# Patient Record
Sex: MALE | Race: OTHER | HISPANIC OR LATINO | URBAN - METROPOLITAN AREA
[De-identification: names, ages, dates, MRNs, and addresses within clinical notes are randomized per-mention and may not be internally consistent; named-entity substitution may affect disease eponyms.]

---

## 2022-07-22 ENCOUNTER — INPATIENT (INPATIENT)
Facility: HOSPITAL | Age: 27
LOS: 2 days | Discharge: HOME | DRG: 519 | End: 2022-07-25
Attending: STUDENT IN AN ORGANIZED HEALTH CARE EDUCATION/TRAINING PROGRAM | Admitting: STUDENT IN AN ORGANIZED HEALTH CARE EDUCATION/TRAINING PROGRAM
Payer: SELF-PAY

## 2022-07-22 VITALS
HEART RATE: 96 BPM | HEIGHT: 64 IN | DIASTOLIC BLOOD PRESSURE: 75 MMHG | TEMPERATURE: 99 F | RESPIRATION RATE: 18 BRPM | WEIGHT: 186.95 LBS | SYSTOLIC BLOOD PRESSURE: 125 MMHG | OXYGEN SATURATION: 97 %

## 2022-07-22 PROCEDURE — 99284 EMERGENCY DEPT VISIT MOD MDM: CPT

## 2022-07-22 RX ORDER — DIAZEPAM 5 MG
10 TABLET ORAL ONCE
Refills: 0 | Status: DISCONTINUED | OUTPATIENT
Start: 2022-07-22 | End: 2022-07-22

## 2022-07-22 RX ORDER — KETOROLAC TROMETHAMINE 30 MG/ML
30 SYRINGE (ML) INJECTION ONCE
Refills: 0 | Status: DISCONTINUED | OUTPATIENT
Start: 2022-07-22 | End: 2022-07-22

## 2022-07-22 RX ADMIN — Medication 10 MILLIGRAM(S): at 20:13

## 2022-07-22 RX ADMIN — Medication 60 MILLIGRAM(S): at 20:13

## 2022-07-22 RX ADMIN — Medication 30 MILLIGRAM(S): at 20:13

## 2022-07-22 RX ADMIN — Medication 30 MILLIGRAM(S): at 20:43

## 2022-07-22 NOTE — ED PROVIDER NOTE - NSFOLLOWUPINSTRUCTIONS_ED_ALL_ED_FT
motrin 600mg by mouth every 6 hours  robaxin 750mg- 1500mg by mouth 3 times a day for 5 days  prednisone 20mg by mouth 2 times a day for 5 days  follow up with doctor in 3 - 5 days  no heavy lifting

## 2022-07-22 NOTE — ED PROVIDER NOTE - PHYSICAL EXAMINATION
Alert, lucid, and in no apparent distress. Pt is normocephalic, atraumatic.  Pupils are equal, round,  lips pink, moist mucous membranes, tongue midline. Neck supple.   Lungs clear to ausultation. Heart regular rate and rhythm, normal S1, S2,   Abdomen is soft, nontender, no pulsatile mass, no masses, no distension  to palp over left buttock  4/5 motor foot with plantar flexion;  sensation intact.   Non-focal sensory, 5 out of 5 motor strength, no dysmetria, fluent, goal directed speech. CN2 to 12 intact. Skin without rash, purpura, eccyhmosis  No submandibular adenopathy. Normal mentation, does not appear agitated

## 2022-07-22 NOTE — ED PROVIDER NOTE - OBJECTIVE STATEMENT
25 yo male no pmh comes to ed with 6 days of pain and numbness in left leg after lifting 300pounds onto thighs while exercising; pt noted losing control of weight; pt noted symptoms have persisted;  denies urinary or fecal incontinence;

## 2022-07-22 NOTE — ED ADULT NURSE NOTE - PAIN RATING/NUMBER SCALE (0-10): ACTIVITY
Right LE Active ROM was WFL (within functional limits)/deficits as listed below/Left UE Active ROM was WFL (within functional limits)/Right UE Active ROM was WFL (within functional limits)/Left LE Active ROM was WFL (within functional limits)/Right UE shoulder flex ~ 90 degrees. Right hip flex ~ 90 degrees and Right DF ~ -15 degrees. Left LE hip flex ~ 90 degrees, knee flex/ext ~0-~70 degrees and DF neutral. 6

## 2022-07-22 NOTE — ED PROVIDER NOTE - CLINICAL SUMMARY MEDICAL DECISION MAKING FREE TEXT BOX
back pain with radiation to left leg;   eval fraction ; ct lumbosacral  prednisone , valium and toradol back pain with radiation to left leg;   eval fracture ; ct lumbosacral  prednisone , valium and toradol

## 2022-07-22 NOTE — ED PROVIDER NOTE - PATIENT PORTAL LINK FT
You can access the FollowMyHealth Patient Portal offered by [facility] by registering at the following website: [email or web address]. By joining [facility]’s FollowMyHealth portal, you will also be able to view your health information using other applications (apps) compatible with our system.

## 2022-07-22 NOTE — ED ADULT TRIAGE NOTE - CHIEF COMPLAINT QUOTE
pt c/o pain to left foot with numbness was exercising & foot is numb and having back pain as well since saturday   A&Ox3, resp wnl, states numbness to great toe, unable to flex foot

## 2022-07-22 NOTE — ED PROVIDER NOTE - CARE PROVIDER_API CALL
Chrissie Rodriguez)  Neurosurgery  [address] [place] [address]  Phone: [phone]  Fax: [phone]  Follow Up Time:

## 2022-07-22 NOTE — ED ADULT NURSE NOTE - OBJECTIVE STATEMENT
PT A&Ox4 states he was working out and with a weight on his abdomen which require him to lift with his hips pt states he went to fast and weight came down hard on hip and he felt the pain in his back. pt states the pain starts in lower back and radiates down left leg to the foot which he states he currently has numbness to his toe. pt updated on plan of care awaiting ct scan.

## 2022-07-23 ENCOUNTER — TRANSCRIPTION ENCOUNTER (OUTPATIENT)
Age: 27
End: 2022-07-23

## 2022-07-23 DIAGNOSIS — M48.061 SPINAL STENOSIS, LUMBAR REGION WITHOUT NEUROGENIC CLAUDICATION: ICD-10-CM

## 2022-07-23 DIAGNOSIS — M54.9 DORSALGIA, UNSPECIFIED: ICD-10-CM

## 2022-07-23 LAB
ABO RH CONFIRMATION: SIGNIFICANT CHANGE UP
ALBUMIN SERPL ELPH-MCNC: 5 G/DL — SIGNIFICANT CHANGE UP (ref 3.3–5.2)
ALP SERPL-CCNC: 82 U/L — SIGNIFICANT CHANGE UP (ref 40–120)
ALT FLD-CCNC: 111 U/L — HIGH
ANION GAP SERPL CALC-SCNC: 12 MMOL/L — SIGNIFICANT CHANGE UP (ref 5–17)
APTT BLD: 35.9 SEC — HIGH (ref 27.5–35.5)
AST SERPL-CCNC: 63 U/L — HIGH
BASOPHILS # BLD AUTO: 0.04 K/UL — SIGNIFICANT CHANGE UP (ref 0–0.2)
BASOPHILS NFR BLD AUTO: 0.4 % — SIGNIFICANT CHANGE UP (ref 0–2)
BILIRUB SERPL-MCNC: 0.8 MG/DL — SIGNIFICANT CHANGE UP (ref 0.4–2)
BLD GP AB SCN SERPL QL: SIGNIFICANT CHANGE UP
BUN SERPL-MCNC: 14.3 MG/DL — SIGNIFICANT CHANGE UP (ref 8–20)
CALCIUM SERPL-MCNC: 9.5 MG/DL — SIGNIFICANT CHANGE UP (ref 8.6–10.2)
CHLORIDE SERPL-SCNC: 101 MMOL/L — SIGNIFICANT CHANGE UP (ref 98–107)
CO2 SERPL-SCNC: 26 MMOL/L — SIGNIFICANT CHANGE UP (ref 22–29)
CREAT SERPL-MCNC: 0.99 MG/DL — SIGNIFICANT CHANGE UP (ref 0.5–1.3)
EGFR: 106 ML/MIN/1.73M2 — SIGNIFICANT CHANGE UP
EOSINOPHIL # BLD AUTO: 0.01 K/UL — SIGNIFICANT CHANGE UP (ref 0–0.5)
EOSINOPHIL NFR BLD AUTO: 0.1 % — SIGNIFICANT CHANGE UP (ref 0–6)
GLUCOSE SERPL-MCNC: 150 MG/DL — HIGH (ref 70–99)
HCT VFR BLD CALC: 49.5 % — SIGNIFICANT CHANGE UP (ref 39–50)
HGB BLD-MCNC: 17.4 G/DL — HIGH (ref 13–17)
IMM GRANULOCYTES NFR BLD AUTO: 1.2 % — SIGNIFICANT CHANGE UP (ref 0–1.5)
INR BLD: 1.02 RATIO — SIGNIFICANT CHANGE UP (ref 0.88–1.16)
LYMPHOCYTES # BLD AUTO: 1.49 K/UL — SIGNIFICANT CHANGE UP (ref 1–3.3)
LYMPHOCYTES # BLD AUTO: 15.2 % — SIGNIFICANT CHANGE UP (ref 13–44)
MCHC RBC-ENTMCNC: 30.4 PG — SIGNIFICANT CHANGE UP (ref 27–34)
MCHC RBC-ENTMCNC: 35.2 GM/DL — SIGNIFICANT CHANGE UP (ref 32–36)
MCV RBC AUTO: 86.4 FL — SIGNIFICANT CHANGE UP (ref 80–100)
MONOCYTES # BLD AUTO: 0.16 K/UL — SIGNIFICANT CHANGE UP (ref 0–0.9)
MONOCYTES NFR BLD AUTO: 1.6 % — LOW (ref 2–14)
NEUTROPHILS # BLD AUTO: 8.01 K/UL — HIGH (ref 1.8–7.4)
NEUTROPHILS NFR BLD AUTO: 81.5 % — HIGH (ref 43–77)
PLATELET # BLD AUTO: 252 K/UL — SIGNIFICANT CHANGE UP (ref 150–400)
POTASSIUM SERPL-MCNC: 4.3 MMOL/L — SIGNIFICANT CHANGE UP (ref 3.5–5.3)
POTASSIUM SERPL-SCNC: 4.3 MMOL/L — SIGNIFICANT CHANGE UP (ref 3.5–5.3)
PROT SERPL-MCNC: 7.7 G/DL — SIGNIFICANT CHANGE UP (ref 6.6–8.7)
PROTHROM AB SERPL-ACNC: 11.8 SEC — SIGNIFICANT CHANGE UP (ref 10.5–13.4)
RBC # BLD: 5.73 M/UL — SIGNIFICANT CHANGE UP (ref 4.2–5.8)
RBC # FLD: 11.9 % — SIGNIFICANT CHANGE UP (ref 10.3–14.5)
SARS-COV-2 RNA SPEC QL NAA+PROBE: SIGNIFICANT CHANGE UP
SODIUM SERPL-SCNC: 139 MMOL/L — SIGNIFICANT CHANGE UP (ref 135–145)
WBC # BLD: 9.83 K/UL — SIGNIFICANT CHANGE UP (ref 3.8–10.5)
WBC # FLD AUTO: 9.83 K/UL — SIGNIFICANT CHANGE UP (ref 3.8–10.5)

## 2022-07-23 PROCEDURE — 72148 MRI LUMBAR SPINE W/O DYE: CPT | Mod: 26,ME

## 2022-07-23 PROCEDURE — 99252 IP/OBS CONSLTJ NEW/EST SF 35: CPT

## 2022-07-23 PROCEDURE — G1004: CPT

## 2022-07-23 PROCEDURE — 72131 CT LUMBAR SPINE W/O DYE: CPT | Mod: 26,MA

## 2022-07-23 RX ORDER — ACETAMINOPHEN 500 MG
975 TABLET ORAL ONCE
Refills: 0 | Status: COMPLETED | OUTPATIENT
Start: 2022-07-23 | End: 2022-07-23

## 2022-07-23 RX ORDER — METHOCARBAMOL 500 MG/1
1500 TABLET, FILM COATED ORAL ONCE
Refills: 0 | Status: COMPLETED | OUTPATIENT
Start: 2022-07-23 | End: 2022-07-23

## 2022-07-23 RX ORDER — CEFAZOLIN SODIUM 1 G
2000 VIAL (EA) INJECTION ONCE
Refills: 0 | Status: COMPLETED | OUTPATIENT
Start: 2022-07-24 | End: 2022-07-24

## 2022-07-23 RX ORDER — GABAPENTIN 400 MG/1
300 CAPSULE ORAL ONCE
Refills: 0 | Status: COMPLETED | OUTPATIENT
Start: 2022-07-23 | End: 2022-07-23

## 2022-07-23 RX ORDER — METHOCARBAMOL 500 MG/1
2 TABLET, FILM COATED ORAL
Qty: 30 | Refills: 0
Start: 2022-07-23 | End: 2022-07-27

## 2022-07-23 RX ORDER — IBUPROFEN 200 MG
1 TABLET ORAL
Qty: 28 | Refills: 0
Start: 2022-07-23 | End: 2022-07-29

## 2022-07-23 RX ORDER — SODIUM CHLORIDE 9 MG/ML
1000 INJECTION INTRAMUSCULAR; INTRAVENOUS; SUBCUTANEOUS
Refills: 0 | Status: DISCONTINUED | OUTPATIENT
Start: 2022-07-23 | End: 2022-07-24

## 2022-07-23 RX ORDER — ACETAMINOPHEN 500 MG
650 TABLET ORAL EVERY 6 HOURS
Refills: 0 | Status: DISCONTINUED | OUTPATIENT
Start: 2022-07-23 | End: 2022-07-25

## 2022-07-23 RX ORDER — KETOROLAC TROMETHAMINE 30 MG/ML
15 SYRINGE (ML) INJECTION ONCE
Refills: 0 | Status: DISCONTINUED | OUTPATIENT
Start: 2022-07-23 | End: 2022-07-23

## 2022-07-23 RX ADMIN — SODIUM CHLORIDE 50 MILLILITER(S): 9 INJECTION INTRAMUSCULAR; INTRAVENOUS; SUBCUTANEOUS at 17:56

## 2022-07-23 RX ADMIN — METHOCARBAMOL 1500 MILLIGRAM(S): 500 TABLET, FILM COATED ORAL at 09:34

## 2022-07-23 RX ADMIN — GABAPENTIN 300 MILLIGRAM(S): 400 CAPSULE ORAL at 09:34

## 2022-07-23 RX ADMIN — Medication 975 MILLIGRAM(S): at 09:34

## 2022-07-23 RX ADMIN — Medication 15 MILLIGRAM(S): at 10:04

## 2022-07-23 RX ADMIN — Medication 15 MILLIGRAM(S): at 09:34

## 2022-07-23 RX ADMIN — Medication 975 MILLIGRAM(S): at 10:04

## 2022-07-23 NOTE — H&P ADULT - NSHPREVIEWOFSYSTEMS_GEN_ALL_CORE
REVIEW OF SYSTEMS  Negative except as noted in HPI  CONSTITUTIONAL: No fever, weight loss, or fatigue  EYES: No eye pain, visual disturbances, or discharge  ENMT:  No difficulty hearing, tinnitus, vertigo; No sinus or throat pain  NECK: No pain or stiffness  BREASTS: No pain, masses, or nipple discharge  RESPIRATORY: No cough, wheezing, chills or hemoptysis; No shortness of breath  CARDIOVASCULAR: No chest pain, palpitations, dizziness, or leg swelling  GASTROINTESTINAL: No abdominal or epigastric pain. No nausea, vomiting, or hematemesis; No diarrhea or constipation. No melena or hematochezia., denies bowel or bladder incontinence  GENITOURINARY: No dysuria, frequency, hematuria, or incontinence  NEUROLOGICAL: No headaches, memory loss, or tremors, (+) decreased sensation on the left lower extremity, left foot weakness  SKIN: No itching, burning, rashes, or lesions   LYMPH NODES: No enlarged glands  ENDOCRINE: No heat or cold intolerance; No hair loss  MUSCULOSKELETAL: No joint pain or swelling; No muscle, back, or extremity pain  PSYCHIATRIC: No depression, anxiety, mood swings, or difficulty sleeping  HEME/LYMPH: No easy bruising, or bleeding gums  ALLERGY AND IMMUNOLOGIC: No hives or eczema

## 2022-07-23 NOTE — H&P ADULT - HISTORY OF PRESENT ILLNESS
Patient is a 26y old  Male who presents with a chief complaint of Left lower extremity numbness, pain and foot drop    HPI:  27 yo male with no significant pmhx presents with father at bedside who reports that last week Friday while doing gluteal thrust the weights fell on him and afterwards he had buttock pain. He states that on Saturday he started to have pain down the back of his leg and into his foot. He states that on Monday he noticed that he was having weakness in his left foot and was unable to move the foot. He denies any bowel or bladder incontinence , weakness, numbness or tingling in his right leg or bilateral upper extremities.       PAST MEDICAL & SURGICAL HISTORY:    FAMILY HISTORY:          Allergies    No Known Allergies    Intolerances      HOME MEDICATIONS:  Home Medications:      MEDICATIONS:  Antibiotics:    Neuro:    Anticoagulation:    OTHER:    IVF:      Vital Signs Last 24 Hrs  T(C): 37.2 (22 Jul 2022 18:12), Max: 37.2 (22 Jul 2022 18:12)  T(F): 99 (22 Jul 2022 18:12), Max: 99 (22 Jul 2022 18:12)  HR: 96 (22 Jul 2022 18:12) (96 - 96)  BP: 125/75 (22 Jul 2022 18:12) (125/75 - 125/75)  BP(mean): --  RR: 18 (22 Jul 2022 18:12) (18 - 18)  SpO2: 97% (22 Jul 2022 18:12) (97% - 97%)    Parameters below as of 22 Jul 2022 18:12  Patient On (Oxygen Delivery Method): room air

## 2022-07-23 NOTE — CHART NOTE - NSCHARTNOTEFT_GEN_A_CORE
Neurosurgery     MRI L Spine completed which shows a large extruded disc fragment that migrated inferiorly on the left.    Plan is for the OR today, probably after 3.   NPO  Consent signed  T&S ordered  Covid done, results pending

## 2022-07-23 NOTE — H&P ADULT - NS ATTEND AMEND GEN_ALL_CORE FT
Neurosurgery attending attestation:    Patient seen and examined at bedside.  Agree with note and plan as stated above.    26-year-old gentleman with otherwise no significant past medical history presents with 5-day history of new onset left leg radiculopathy and foot drop.  He reports that he was in his usual state of health when he was lifting weights on Friday and was doing gluteal bridges when he lost his footing and slipped.  He reports initially he did not seem to have any significant symptoms but by Saturday he was started to experience shooting pain which travels from his buttocks down to his lateral thigh and anterolateral leg to the dorsal aspect of the left foot.  He reports that by Monday he started noticing having trouble lifting his left foot off the ground.  On exam he endorses a L5 distribution left leg radiculopathy as well as 0/5 DF/EHL in the left leg but otherwise full strength throughout.  MRI L-spine demonstrates disc bulges at L4-5 and L5-S1  with notable disc herniation and mobile fragment migration inferiorly on the left lateral recess at L4-5 causing severe stenosis on the left side.  I had a lengthy discussion with the patient at bedside and explained that his symptoms are likely secondary to nerve root compression from the acute disc herniation which likely occurred when he had his accident at the gym.   We discussed that given that he has a new foot drop and weakness he would benefit from surgical decompression through a microdiscectomy to relieve pressure on the affected nerves.  I discussed with him that the goal of surgery, especially now that it has been approximately 5 days since the injury is to prevent any further nerve damage.  We discussed that after this procedure  we would hopefully see improvement in his dorsiflexor function in his left foot given time.  We discussed the various risks and benefits of surgery.  The risks include but are not limited to bleeding, pain, infection, dural tear/CSF leak, damage to surrounding structures, motor/sensory deficits, paralysis, spinal stroke, death, and other risks of anesthesia which include blood clots.  The patient verbalized agreement and understanding of the risks and benefits of surgery and wishes to proceed.  No guarantees of success were stated or implied.   Plan for OR today for left-sided microdiscectomy Neurosurgery attending attestation:    Patient seen and examined at bedside.  Agree with note and plan as stated above.    26-year-old gentleman with otherwise no significant past medical history presents with 5-day history of new onset left leg radiculopathy and foot drop.  He reports that he was in his usual state of health when he was lifting weights on Friday and was doing gluteal bridges when he lost his footing and slipped.  He reports initially he did not seem to have any significant symptoms but by Saturday he was started to experience shooting pain which travels from his buttocks down to his lateral thigh and anterolateral leg to the dorsal aspect of the left foot.  He reports that by Monday he started noticing having trouble lifting his left foot off the ground.  On exam he endorses a L5 distribution left leg radiculopathy as well as 0/5 DF/EHL in the left leg but otherwise full strength throughout.  MRI L-spine demonstrates disc bulges at L4-5 and L5-S1  with notable disc herniation and mobile fragment migration inferiorly on the left lateral recess at L4-5 causing severe stenosis on the left side.  I had a lengthy discussion with the patient at bedside and explained that his symptoms are likely secondary to nerve root compression from the acute disc herniation which likely occurred when he had his accident at the gym.   We discussed that given that he has a new foot drop and weakness he would benefit from surgical decompression through a microdiscectomy to relieve pressure on the affected nerves.  I discussed with him that the goal of surgery, especially now that it has been approximately 5 days since the injury is to prevent any further nerve damage.  We discussed that after this procedure  we would hopefully see improvement in his dorsiflexor function in his left foot given time.  We discussed the various risks and benefits of surgery.  The risks include but are not limited to bleeding, pain, infection, dural tear/CSF leak, damage to surrounding structures, motor/sensory deficits, paralysis, spinal stroke, death, and other risks of anesthesia which include blood clots.  The patient verbalized agreement and understanding of the risks and benefits of surgery and wishes to proceed.  No guarantees of success were stated or implied.   Plan for OR today vs tomorrow morning for left-sided microdiscectomy

## 2022-07-23 NOTE — PATIENT PROFILE ADULT - FALL HARM RISK - UNIVERSAL INTERVENTIONS
Bed in lowest position, wheels locked, appropriate side rails in place/Call bell, personal items and telephone in reach/Instruct patient to call for assistance before getting out of bed or chair/Non-slip footwear when patient is out of bed/[place] to call system/Physically safe environment - no spills, clutter or unnecessary equipment/Purposeful Proactive Rounding/Room/bathroom lighting operational, light cord in reach

## 2022-07-23 NOTE — H&P ADULT - REASON FOR ADMISSION
Left lower back pain, radiating down the back of the leg with numbness and weakness in the left foot

## 2022-07-23 NOTE — H&P ADULT - NSHPPHYSICALEXAM_GEN_ALL_CORE
PHYSICAL EXAM:  GENERAL: NAD, well-groomed, well-developed  EYES: Conjunctiva and sclera clear; corneal reflex intact  ENMT: No tonsillar erythema, exudates, or enlargement; moist mucous membranes, good dentition, no lesions  NECK: Supple  RICHARD COMA SCORE: E- V- M- =15       E: 4= opens eyes spontaneously       V: 5= oriented       M: 6= follows commands   MENTAL STATUS: AAO x3; Awake, Opens eyes spontaneously, Appropriately conversant without aphasia; following simple commands  CRANIAL NERVES: PERRL. EOMI without nystagmus. Facial sensation intact V1-3 distribution b/l. Face symmetric w/ normal eye closure and smile, tongue midline. Hearing grossly intact. Speech clear. Head turning and shoulder shrug intact.   REFLEXES: PERRL. Negative Schneider's b/l. Negative clonus b/l  MOTOR: strength 5/5 b/l upper and lower extremities, except left foot dorsiflexion 0/5, Plantar flexion 3/5, EHL 2/5  Uppers     Delt (C5/6)     Bicep (C5/6)     Wrist Extend (C6)     Tricep (C7)     HG (C8/T1)  R                     5/5                 5/5                         5/5                           5/5                   5/5  L                      5/5                 5/5                         5/5                           5/5                   5/5  Lowers      HF(L1/L2)     KE (L3)     DF (L4)     EHL (L5)     PF (S1)      R                     5/5              5/5           5/5           5/5            5/5  L                     5/5               5/5          0/5            2/5            3/5  SENSATION:  (+) decreased sensation on the left leg lateral shin  CHEST/LUNG: Clear to auscultation bilaterally  HEART: +S1/+S2  ABDOMEN: Soft  EXTREMITIES: no clubbing, cyanosis, or edema  SKIN: Warm, dry; no rashes or lesions

## 2022-07-23 NOTE — H&P ADULT - ASSESSMENT
25 yo male with L4/5, L5/S1 stenosis and extruded disc material compressing the left L5 nerve root, with radicular pain and myelopathy

## 2022-07-23 NOTE — ED ADULT NURSE REASSESSMENT NOTE - NS ED NURSE REASSESS COMMENT FT1
Patient received sleeping in cart, awakes to verbal stimuli.  Patient awaiting MRI.  Patient has no labored breathing and is in no acute distress.

## 2022-07-23 NOTE — H&P ADULT - NSHPLABSRESULTS_GEN_ALL_CORE
LABS:      CULTURES:      RADIOLOGY & ADDITIONAL STUDIES:  < from: CT Lumbar Spine No Cont (07.23.22 @ 00:23) >    IMPRESSION:    Mild intervertebral disc space narrowing at L4-L5 and L5-S1 with   posterior disc/osteophyte complexes and mild facet and ligamentous   hypertrophy. Mild to moderate segmental canal stenosis at L4-L5 and mild   segmental canal stenosisat L5-S1. Abnormal density along the dorsal   aspect of the L4 vertebral body along the left side within the lateral   recess concerning for extruded disc material (series 8:28) which may be   compressing the L5 nerve root. MRI of the lumbar spine isrecommended for   further evaluation.    --- End of Report ---      ZAIN KEY DO; Attending Radiologist  This document has been electronically signed. Jul 23 2022 12:54AM    < end of copied text >      CAPRINI SCORE [CLOT]:  Patient has an estimated Caprini score of greater than 5.  However, the patient's unique clinical situation will be addressed in an individual manner to determine appropriate anticoagulation treatment, if any.

## 2022-07-24 ENCOUNTER — TRANSCRIPTION ENCOUNTER (OUTPATIENT)
Age: 27
End: 2022-07-24

## 2022-07-24 PROCEDURE — 72100 X-RAY EXAM L-S SPINE 2/3 VWS: CPT | Mod: 26

## 2022-07-24 PROCEDURE — 63030 LAMOT DCMPRN NRV RT 1 LMBR: CPT | Mod: AS,LT

## 2022-07-24 PROCEDURE — 63030 LAMOT DCMPRN NRV RT 1 LMBR: CPT | Mod: LT

## 2022-07-24 DEVICE — SPONGE GELFOAM SZ 100 UNCOMPRESSED: Type: IMPLANTABLE DEVICE | Status: FUNCTIONAL

## 2022-07-24 DEVICE — SEALANT FLOSEAL FAST PREP HEMOSTATIC MATRIX 10ML: Type: IMPLANTABLE DEVICE | Status: FUNCTIONAL

## 2022-07-24 RX ORDER — ACETAMINOPHEN 500 MG
650 TABLET ORAL EVERY 6 HOURS
Refills: 0 | Status: DISCONTINUED | OUTPATIENT
Start: 2022-07-24 | End: 2022-07-24

## 2022-07-24 RX ORDER — POLYETHYLENE GLYCOL 3350 17 G/17G
17 POWDER, FOR SOLUTION ORAL DAILY
Refills: 0 | Status: DISCONTINUED | OUTPATIENT
Start: 2022-07-24 | End: 2022-07-25

## 2022-07-24 RX ORDER — ONDANSETRON 8 MG/1
4 TABLET, FILM COATED ORAL EVERY 6 HOURS
Refills: 0 | Status: DISCONTINUED | OUTPATIENT
Start: 2022-07-24 | End: 2022-07-25

## 2022-07-24 RX ORDER — CEFAZOLIN SODIUM 1 G
2000 VIAL (EA) INJECTION EVERY 8 HOURS
Refills: 0 | Status: COMPLETED | OUTPATIENT
Start: 2022-07-24 | End: 2022-07-25

## 2022-07-24 RX ORDER — ACETAMINOPHEN 500 MG
1000 TABLET ORAL ONCE
Refills: 0 | Status: COMPLETED | OUTPATIENT
Start: 2022-07-24 | End: 2022-07-24

## 2022-07-24 RX ORDER — FENTANYL CITRATE 50 UG/ML
25 INJECTION INTRAVENOUS
Refills: 0 | Status: DISCONTINUED | OUTPATIENT
Start: 2022-07-24 | End: 2022-07-24

## 2022-07-24 RX ORDER — OXYCODONE HYDROCHLORIDE 5 MG/1
10 TABLET ORAL EVERY 4 HOURS
Refills: 0 | Status: DISCONTINUED | OUTPATIENT
Start: 2022-07-24 | End: 2022-07-25

## 2022-07-24 RX ORDER — TRAMADOL HYDROCHLORIDE 50 MG/1
50 TABLET ORAL EVERY 6 HOURS
Refills: 0 | Status: DISCONTINUED | OUTPATIENT
Start: 2022-07-24 | End: 2022-07-25

## 2022-07-24 RX ORDER — HYDROMORPHONE HYDROCHLORIDE 2 MG/ML
0.5 INJECTION INTRAMUSCULAR; INTRAVENOUS; SUBCUTANEOUS
Refills: 0 | Status: DISCONTINUED | OUTPATIENT
Start: 2022-07-24 | End: 2022-07-24

## 2022-07-24 RX ORDER — FAMOTIDINE 10 MG/ML
20 INJECTION INTRAVENOUS EVERY 12 HOURS
Refills: 0 | Status: DISCONTINUED | OUTPATIENT
Start: 2022-07-24 | End: 2022-07-25

## 2022-07-24 RX ORDER — SODIUM CHLORIDE 9 MG/ML
1000 INJECTION INTRAMUSCULAR; INTRAVENOUS; SUBCUTANEOUS
Refills: 0 | Status: DISCONTINUED | OUTPATIENT
Start: 2022-07-24 | End: 2022-07-24

## 2022-07-24 RX ORDER — MAGNESIUM HYDROXIDE 400 MG/1
30 TABLET, CHEWABLE ORAL EVERY 12 HOURS
Refills: 0 | Status: DISCONTINUED | OUTPATIENT
Start: 2022-07-24 | End: 2022-07-25

## 2022-07-24 RX ORDER — HYDROMORPHONE HYDROCHLORIDE 2 MG/ML
1 INJECTION INTRAMUSCULAR; INTRAVENOUS; SUBCUTANEOUS EVERY 6 HOURS
Refills: 0 | Status: DISCONTINUED | OUTPATIENT
Start: 2022-07-24 | End: 2022-07-25

## 2022-07-24 RX ORDER — SENNA PLUS 8.6 MG/1
2 TABLET ORAL AT BEDTIME
Refills: 0 | Status: DISCONTINUED | OUTPATIENT
Start: 2022-07-24 | End: 2022-07-25

## 2022-07-24 RX ADMIN — Medication 1000 MILLIGRAM(S): at 04:50

## 2022-07-24 RX ADMIN — OXYCODONE HYDROCHLORIDE 10 MILLIGRAM(S): 5 TABLET ORAL at 22:49

## 2022-07-24 RX ADMIN — SENNA PLUS 2 TABLET(S): 8.6 TABLET ORAL at 22:49

## 2022-07-24 RX ADMIN — Medication 100 MILLIGRAM(S): at 17:00

## 2022-07-24 RX ADMIN — OXYCODONE HYDROCHLORIDE 10 MILLIGRAM(S): 5 TABLET ORAL at 23:50

## 2022-07-24 RX ADMIN — Medication 400 MILLIGRAM(S): at 03:35

## 2022-07-24 NOTE — PROGRESS NOTE ADULT - ASSESSMENT
25 yo male POD#0 s/p a Left L5 hemilaminectomy and a L4/5 discectomy     Plan:  ADAT  Pain Control PRN  OOB with PT in AM  Continue q4 hour neurochecks and notify NSG STAT for changes or concerns  Encourage incentive spirometer 10x/hr  Case and plan discussed with Dr. Rodriguez

## 2022-07-24 NOTE — BRIEF OPERATIVE NOTE - NSICDXBRIEFPROCEDURE_GEN_ALL_CORE_FT
PROCEDURES:  Laminotomy, spine, lumbar, with 1 or more of partial facetectomy, foraminotomy, or herniated discectomy if indicated 24-Jul-2022 18:50:57  Valentín Dhillon

## 2022-07-24 NOTE — PROGRESS NOTE ADULT - NS ATTEND AMEND GEN_ALL_CORE FT
Neurosurgery Attending Attestation:    Patient seen and examined at bedside. Agree with plan and note as documented above.    To OR today for L L4/5 microdiscectomy.
Neurosurgery Attending Attestation:    Patient seen and examined at bedside. Agree with plan and note as documented above.     s/p L L4/5 microdiscectomy, doing well.

## 2022-07-24 NOTE — BRIEF OPERATIVE NOTE - OPERATION/FINDINGS
Left L4-5 Inferior projecting HNP with disc lodged in the nerve root axillae.  Good disc removal with decompression of exiting nerve root.

## 2022-07-25 ENCOUNTER — TRANSCRIPTION ENCOUNTER (OUTPATIENT)
Age: 27
End: 2022-07-25

## 2022-07-25 VITALS
SYSTOLIC BLOOD PRESSURE: 114 MMHG | RESPIRATION RATE: 18 BRPM | HEART RATE: 99 BPM | DIASTOLIC BLOOD PRESSURE: 63 MMHG | OXYGEN SATURATION: 94 % | TEMPERATURE: 98 F

## 2022-07-25 DIAGNOSIS — M54.16 RADICULOPATHY, LUMBAR REGION: ICD-10-CM

## 2022-07-25 PROCEDURE — 85025 COMPLETE CBC W/AUTO DIFF WBC: CPT

## 2022-07-25 PROCEDURE — U0005: CPT

## 2022-07-25 PROCEDURE — 36415 COLL VENOUS BLD VENIPUNCTURE: CPT

## 2022-07-25 PROCEDURE — 80053 COMPREHEN METABOLIC PANEL: CPT

## 2022-07-25 PROCEDURE — 72100 X-RAY EXAM L-S SPINE 2/3 VWS: CPT

## 2022-07-25 PROCEDURE — G1004: CPT

## 2022-07-25 PROCEDURE — U0003: CPT

## 2022-07-25 PROCEDURE — 86900 BLOOD TYPING SEROLOGIC ABO: CPT

## 2022-07-25 PROCEDURE — C9399: CPT

## 2022-07-25 PROCEDURE — 85730 THROMBOPLASTIN TIME PARTIAL: CPT

## 2022-07-25 PROCEDURE — 85610 PROTHROMBIN TIME: CPT

## 2022-07-25 PROCEDURE — 72148 MRI LUMBAR SPINE W/O DYE: CPT | Mod: ME

## 2022-07-25 PROCEDURE — 96372 THER/PROPH/DIAG INJ SC/IM: CPT

## 2022-07-25 PROCEDURE — C1889: CPT

## 2022-07-25 PROCEDURE — 99285 EMERGENCY DEPT VISIT HI MDM: CPT

## 2022-07-25 PROCEDURE — 72131 CT LUMBAR SPINE W/O DYE: CPT | Mod: MA

## 2022-07-25 PROCEDURE — 86901 BLOOD TYPING SEROLOGIC RH(D): CPT

## 2022-07-25 PROCEDURE — 86850 RBC ANTIBODY SCREEN: CPT

## 2022-07-25 PROCEDURE — 96374 THER/PROPH/DIAG INJ IV PUSH: CPT

## 2022-07-25 RX ORDER — ACETAMINOPHEN 500 MG
2 TABLET ORAL
Qty: 0 | Refills: 0 | DISCHARGE
Start: 2022-07-25

## 2022-07-25 RX ORDER — TRAMADOL HYDROCHLORIDE 50 MG/1
1 TABLET ORAL
Qty: 20 | Refills: 0
Start: 2022-07-25 | End: 2022-07-29

## 2022-07-25 RX ADMIN — Medication 100 MILLIGRAM(S): at 10:06

## 2022-07-25 RX ADMIN — POLYETHYLENE GLYCOL 3350 17 GRAM(S): 17 POWDER, FOR SOLUTION ORAL at 11:28

## 2022-07-25 RX ADMIN — Medication 1 TABLET(S): at 11:28

## 2022-07-25 RX ADMIN — Medication 100 MILLIGRAM(S): at 00:59

## 2022-07-25 NOTE — DISCHARGE NOTE PROVIDER - NSDCCPCAREPLAN_GEN_ALL_CORE_FT
PRINCIPAL DISCHARGE DIAGNOSIS  Diagnosis: Left foot drop  Assessment and Plan of Treatment: s/p decompressive lumbar surgery      SECONDARY DISCHARGE DIAGNOSES  Diagnosis: Lumbar disc herniation  Assessment and Plan of Treatment: s/p decompressive lumbar surgery

## 2022-07-25 NOTE — DISCHARGE NOTE NURSING/CASE MANAGEMENT/SOCIAL WORK - NSDCPEFALRISK_GEN_ALL_CORE
For information on Fall & Injury Prevention, visit: [email or web address] OR  [email or web address] OR  https://www.cdc.gov/steadi/patient.html

## 2022-07-25 NOTE — PROGRESS NOTE ADULT - SUBJECTIVE AND OBJECTIVE BOX
I returned and fit Brandt with a left Carbon AFO which fit will in the shoes he currently has with him. He was able to stand and walked 10 to 15 feet easily, stating that the brace worked well lifting his foot. Contact info was provided, and brace and shoe were placed on window sill.   [facility]  [phone]
Post Operative Check   Procedure:  POD#0 s/p a left L5 cecy laminectomy and a L4/5 Disectomy     HPI:  Patient is a 26y old  Male who presents with a chief complaint of Left lower extremity numbness, pain and foot drop    HPI:  27 yo male with no significant pmhx presents with father at bedside who reports that last week Friday while doing gluteal thrust the weights fell on him and afterwards he had buttock pain. He states that on Saturday he started to have pain down the back of his leg and into his foot. He states that on Monday he noticed that he was having weakness in his left foot and was unable to move the foot. He denies any bowel or bladder incontinence , weakness, numbness or tingling in his right leg or bilateral upper extremities.       PAST MEDICAL & SURGICAL HISTORY:    FAMILY HISTORY:          Allergies    No Known Allergies    Intolerances      HOME MEDICATIONS:  Home Medications:      MEDICATIONS:  Antibiotics:    Neuro:    Anticoagulation:    OTHER:    IVF:      Vital Signs Last 24 Hrs  T(C): 37.2 (22 Jul 2022 18:12), Max: 37.2 (22 Jul 2022 18:12)  T(F): 99 (22 Jul 2022 18:12), Max: 99 (22 Jul 2022 18:12)  HR: 96 (22 Jul 2022 18:12) (96 - 96)  BP: 125/75 (22 Jul 2022 18:12) (125/75 - 125/75)  BP(mean): --  RR: 18 (22 Jul 2022 18:12) (18 - 18)  SpO2: 97% (22 Jul 2022 18:12) (97% - 97%)    Parameters below as of 22 Jul 2022 18:12  Patient On (Oxygen Delivery Method): room air   (23 Jul 2022 01:35)    INTERVAL HPI/OVERNIGHT EVENTS:  POD#0    26y Male seen lying comfortably in bed, tolerating a diet. Patient reports improvement in his left leg numbness and pain. He reports 5/10 incision pain at this time and continued weakness in his left foot. He denies any new numbness or tingling in her upper extremities or right leg.     Vital Signs Last 24 Hrs  T(C): 36.9 (24 Jul 2022 21:39), Max: 36.9 (24 Jul 2022 08:30)  T(F): 98.5 (24 Jul 2022 21:39), Max: 98.5 (24 Jul 2022 08:30)  HR: 84 (24 Jul 2022 21:39) (58 - 84)  BP: 121/70 (24 Jul 2022 21:39) (106/56 - 133/59)  BP(mean): --  RR: 18 (24 Jul 2022 21:39) (12 - 18)  SpO2: 94% (24 Jul 2022 21:39) (94% - 99%)    Parameters below as of 24 Jul 2022 20:40  Patient On (Oxygen Delivery Method): room air    PHYSICAL EXAM:  GENERAL: NAD, well-groomed, well-developed  WOUND: Dressing clean dry intact  MENTAL STATUS: AAO x3; Awake Opens eyes spontaneously, Appropriately conversant without aphasia, following simple commands  CRANIAL NERVES: PERRL; EOMI; No facial asymmetry; facial sensation grossly intact to light touch b/l;  tongue midline; palate rises symmetrically; gag reflex intact  MOTOR: strength 5/5 B/L upper and right lower extremity 5/5, left HF, KE and KF 5/5, Dorsiflexion and EHL 0/5, Plantar flexion 3/5, negative Schneider's b/l; negative clonus b/l  ABDOMEN: Soft  SKIN: Warm, dry; no rashes or lesions    LABS:                        17.4   9.83  )-----------( 252      ( 23 Jul 2022 01:45 )             49.5     07-23    139  |  101  |  14.3  ----------------------------<  150<H>  4.3   |  26.0  |  0.99    Ca    9.5      23 Jul 2022 01:45    TPro  7.7  /  Alb  5.0  /  TBili  0.8  /  DBili  x   /  AST  63<H>  /  ALT  111<H>  /  AlkPhos  82  07-23    PT/INR - ( 23 Jul 2022 01:45 )   PT: 11.8 sec;   INR: 1.02 ratio         PTT - ( 23 Jul 2022 01:45 )  PTT:35.9 sec      07-23 @ 07:01  -  07-24 @ 07:00  --------------------------------------------------------  IN: 150 mL / OUT: 0 mL / NET: 150 mL    07-24 @ 07:01  -  07-24 @ 21:52  --------------------------------------------------------  IN: 150 mL / OUT: 400 mL / NET: -250 mL    RADIOLOGY & ADDITIONAL TESTS:  < from: MR Lumbar Spine No Cont (07.23.22 @ 08:42) >  IMPRESSION:    1. Lumbar straightening.  2. Moderate to large herniation L4-5. A broad-based extrusion is more   prevalent to the right, but a sequestered fragment is migrated inferiorly   on the left. There is resultant bilateral impingement. See detail above.  3. Small midline herniation L5-S1 contained within the ventral epidural   fat. Tiny midline protrusion T11-T12.    --- End of Report ---    MAVERICK LARSEN MD; Attending Radiologist  This document has been electronically signed. Jul 23 2022  8:50AM    < end of copied text >    
Neurosurgery:  27 yo male with no significant pmhx presents with father at bedside who reports that last week Friday while doing gluteal thrust the weights fell on him and afterwards he had buttock pain. He states that on Saturday he started to have pain down the back of his leg and into his foot. He states that on Monday he noticed that he was having weakness in his left foot and was unable to move the foot. He denies any bowel or bladder incontinence , weakness, numbness or tingling in his right leg or bilateral upper extremities.     24 hours:  Pt for OR today for Left foot drop.  Left L5 nerve root compression      Vital Signs Last 24 Hrs  T(C): 36.5 (24 Jul 2022 13:10), Max: 36.9 (24 Jul 2022 08:30)  T(F): 97.7 (24 Jul 2022 13:10), Max: 98.5 (24 Jul 2022 08:30)  HR: 65 (24 Jul 2022 13:10) (58 - 71)  BP: 125/78 (24 Jul 2022 13:10) (106/56 - 126/79)  BP(mean): --  RR: 18 (24 Jul 2022 13:10) (17 - 20)  SpO2: 97% (24 Jul 2022 13:10) (96% - 98%)    Parameters below as of 24 Jul 2022 13:10  Patient On (Oxygen Delivery Method): room air      LABS:                        17.4   9.83  )-----------( 252      ( 23 Jul 2022 01:45 )             49.5     07-23    139  |  101  |  14.3  ----------------------------<  150<H>  4.3   |  26.0  |  0.99    Ca    9.5      23 Jul 2022 01:45    TPro  7.7  /  Alb  5.0  /  TBili  0.8  /  DBili  x   /  AST  63<H>  /  ALT  111<H>  /  AlkPhos  82  07-23    PT/INR - ( 23 Jul 2022 01:45 )   PT: 11.8 sec;   INR: 1.02 ratio         PTT - ( 23 Jul 2022 01:45 )  PTT:35.9 sec      Assessment:  26M with L4-5 disc and impingment of L5 nerve root on the left.      Plan:  - Keep NPO  - OR today  - consent obtained  - medically opitimized  - d/w and evaluated by Dr. Rodriguez on rounds this am  - DVT ppx
  I met Brandt and measured his LLE for a drop foot AFO as requested via text. I will return later today to fit and deliver the device.  [place] [phone]

## 2022-07-25 NOTE — DISCHARGE NOTE PROVIDER - NSDCMRMEDTOKEN_GEN_ALL_CORE_FT
mg oral tablet: 1 tab(s) orally every 6 hours   methocarbamol 750 mg oral tablet: 2 tab(s) orally 3 times a day   predniSONE 20 mg oral tablet: 1 tab(s) orally 2 times a day    acetaminophen 325 mg oral tablet: 2 tab(s) orally every 6 hours, As needed, Temp greater or equal to 38C (100.4F), Mild Pain (1 - 3)   mg oral tablet: 1 tab(s) orally every 6 hours   methocarbamol 750 mg oral tablet: 2 tab(s) orally 3 times a day   Multiple Vitamins oral tablet: 1 tab(s) orally once a day  traMADol 50 mg oral tablet: 1 tab(s) orally every 6 hours, As needed, Severe Pain (7 - 10) MDD:4 tab

## 2022-07-25 NOTE — DISCHARGE NOTE PROVIDER - NSDCFUADDINST_GEN_ALL_CORE_FT
Please keep your incision clean and dry. You may take a regular show, DO NOT take a bath. You are NOT allowed to submerge in water. DO NOT wash your incision, let the water from the shower run over the incision and pat dry only after. DO NOT rub itch or scratch your incision. Watch for signs of infection including redness and swelling as well as discharge. Monitor for fevers. If you have any concerns please contact the office. You will need to wear your LEFT AFO boot for weakness. You will also need to participate in outpatient physical therapy.

## 2022-07-25 NOTE — PHYSICAL THERAPY INITIAL EVALUATION ADULT - ADDITIONAL COMMENTS
Pt lives in a house with 6 steps to enter with  rails and 6  stairs inside with  rails.  Pt owns medical equipment: none   Pt lives with: parents   Someone is always available to provide assist.

## 2022-07-25 NOTE — DISCHARGE NOTE PROVIDER - HOSPITAL COURSE
27 yo male with no significant pmhx presents to ER and reports that last week Friday while doing gluteal thrust the weights fell on him and afterwards he had buttock pain. He states that on Saturday he started to have pain down the back of his leg and into his foot. He states that on Monday he noticed that he was having weakness in his left foot and was unable to move the foot. He denies any bowel or bladder incontinence , weakness, numbness or tingling in his right leg or bilateral upper extremities.     7/23/22 CTL: Mild intervertebral disc space narrowing at L4-L5 and L5-S1 with posterior disc/osteophyte complexes and mild facet and ligamentous hypertrophy. Mild to moderate segmental canal stenosis at L4-L5 and mild segmental canal stenosis at L5-S1. Abnormal density along the dorsal aspect of the L4 vertebral body along the left side within the lateral   recess concerning for extruded disc material  7/23/22 MRIL: Moderate to large herniation L4-5. A broad-based extrusion is more prevalent to the right, but a sequestered fragment is migrated inferiorly on the left. There is resultant bilateral impingement.    He is now POD#2 s/p L4-5 discectomy and L5 cecy laminectomy. His post op stay has been overall stable. His LEFT foot drop still persistent. AFO boot was delivered and patient instructed on how to use. He was also seen by PT and is planned for outpatient PT. His condition is stable for discharge at this time.

## 2022-07-25 NOTE — DISCHARGE NOTE PROVIDER - CARE PROVIDER_API CALL
Chrissie Rodriguez)  Neurosurgery  [address] [place] [address]  Phone: [phone]  Fax: [phone]  Follow Up Time: 1 week

## 2022-07-25 NOTE — PHYSICAL THERAPY INITIAL EVALUATION ADULT - ACTIVE RANGE OF MOTION EXAMINATION, REHAB EVAL
Left foot drop noted, weakness with DF, 1/5/bilateral lower extremity Active ROM was WNL (within normal limits)/bilateral  lower extremity Active ROM was WFL (within functional limits)/deficits as listed below

## 2022-07-25 NOTE — DISCHARGE NOTE PROVIDER - NSDCCPTREATMENT_GEN_ALL_CORE_FT
PRINCIPAL PROCEDURE  Procedure: Lumbar discectomy  Findings and Treatment: L4-5 disc herniation      SECONDARY PROCEDURE  Procedure: MRI lumbar spine wo contrast  Findings and Treatment: Broad based extrusion of disc at L4-5

## 2022-07-25 NOTE — DISCHARGE NOTE NURSING/CASE MANAGEMENT/SOCIAL WORK - PATIENT PORTAL LINK FT
You can access the FollowMyHealth Patient Portal offered by [facility] by registering at the following website: [email or web address]. By joining [facility]’s FollowMyHealth portal, you will also be able to view your health information using other applications (apps) compatible with our system.

## 2022-07-25 NOTE — PHYSICAL THERAPY INITIAL EVALUATION ADULT - DID THE PATIENT HAVE SURGERY?
Left L4-5 Inferior projecting HNP with disc lodged in the nerve root axillae.  Good disc removal with decompression of exiting nerve root/yes

## 2022-08-01 PROBLEM — Z78.9 OTHER SPECIFIED HEALTH STATUS: Chronic | Status: ACTIVE | Noted: 2022-07-23

## 2022-08-02 PROBLEM — Z00.00 ENCOUNTER FOR PREVENTIVE HEALTH EXAMINATION: Status: ACTIVE | Noted: 2022-08-02

## 2022-08-05 ENCOUNTER — APPOINTMENT (OUTPATIENT)
Dept: NEUROSURGERY | Facility: CLINIC | Age: 27
End: 2022-08-05

## 2022-08-05 VITALS
WEIGHT: 187 LBS | OXYGEN SATURATION: 97 % | TEMPERATURE: 98.3 F | SYSTOLIC BLOOD PRESSURE: 128 MMHG | BODY MASS INDEX: 31.92 KG/M2 | DIASTOLIC BLOOD PRESSURE: 78 MMHG | HEIGHT: 64 IN | HEART RATE: 79 BPM

## 2022-08-05 DIAGNOSIS — Z78.9 OTHER SPECIFIED HEALTH STATUS: ICD-10-CM

## 2022-08-05 DIAGNOSIS — M51.16 INTERVERTEBRAL DISC DISORDERS WITH RADICULOPATHY, LUMBAR REGION: ICD-10-CM

## 2022-08-05 DIAGNOSIS — Z83.3 FAMILY HISTORY OF DIABETES MELLITUS: ICD-10-CM

## 2022-08-05 PROCEDURE — 99024 POSTOP FOLLOW-UP VISIT: CPT

## 2022-08-05 RX ORDER — MULTIVITAMIN
TABLET ORAL
Refills: 0 | Status: ACTIVE | COMMUNITY

## 2022-08-05 NOTE — ASSESSMENT
[FreeTextEntry1] : Mr. Murphy Is overall doing extremely well now approximately 2 weeks status post left L4-5 microdiscectomy.  He reports that his sensation has almost completely returned to normal and he no longer has any radicular pain or any back pain or surgical site pain.  On exam he continues to have no significant observable dorsiflexion or EHL in his left foot and is currently wearing an orthotic to help with foot drop.  We discussed that nerve recovery can take up to 6 months to 1 year to fully occur and so we will not know to what extent his foot drop will  improve until this time but that I remain hopeful given his age  and overall health for some sort of meaningful improvement.  We discussed several lifestyle changes such as avoiding lifting heavy weights and axial pressure on his back.  We discussed having him do exercises which are perpendicular to his back such as leg press or bench press and to overall  focus on lower weights and higher reps when exercising.  We furthermore discussed that  given that he has already had a disc herniation requiring surgery he is at a higher risk in the future of developing further degeneration which could lead to need for further surgery.  I advised him to wait approximately 4 to 6 weeks from surgery before restarting any exercises more rigorous than walking.  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  We will provide a return to work letter.  We discussed having him follow-up with me in 3 months time to see how well he is doing and his overall progress but should any new concerns or symptoms arise he should reach out to us for an earlier appointment.  All questions were answered.\par \par Plan:\par – Okay to return to work while avoiding any heavy lifting or vigorous exercise\par - okay to reinitiate exercise in 4 to 6 weeks, slowly increasing intensity while attempting to transition to more low weight high rep activities\par - follow-up in 3 months

## 2022-08-05 NOTE — PHYSICAL EXAM
[FreeTextEntry1] : alert, awake\par interactive, appropriate\par moving BUE well to command with good strength\par RLE 5/5 HF/KF/KE/DF/PF/EHL\par LLE 5/5 HF/KF/KE/PF 0/5 DF/EHL\par SILT except for some mild numbness in L first toe\par incision well healed without any evidence of erythema, drainage or dehiscence

## 2022-08-05 NOTE — HISTORY OF PRESENT ILLNESS
[FreeTextEntry1] : Mr. Murphy  is a very pleasant 26-year-old gentleman with no significant past medical history who presented on July 23, 2022  to [facility] ED with a 5-day history of left L5 radicular pain and new left foot drop after doing gluteal bridges. MRI L-spine without contrast demonstrated a left L4-5 disc herniation with caudal migration of the disc fragment causing left L5 nerve root compression.  He underwent left L4-5 microdiscectomy on 7/25/2022.\par \par Mr. Murphy reports that he is overall doing very well now almost 2 weeks status post surgery.  He reports that his sensation in his lateral thigh and anterior shin and dorsal surface of the foot has largely improved back to normal with only some mild numbness in the dorsal aspect of the first toe.  He also reports that the shooting pain he had previously has completely resolved.  He currently walks with an orthotic to prevent dropfoot but does endorse feeling like he is able to very minorly lift  his left foot so that he does not drag it when walking.  He denies any issues with the incision reports he has virtually no pain at the surgical site.  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

## 2022-10-24 ENCOUNTER — APPOINTMENT (OUTPATIENT)
Dept: NEUROSURGERY | Facility: CLINIC | Age: 27
End: 2022-10-24

## 2023-08-08 NOTE — ED ADULT TRIAGE NOTE - STATUS:
Applied Consent 2/Introductory Paragraph: Mohs surgery was explained to the patient and consent was obtained. The risks, benefits and alternatives to therapy were discussed in detail. Specifically, the risks of infection, scarring, bleeding, prolonged wound healing, incomplete removal, allergy to anesthesia, nerve injury and recurrence were addressed. Prior to the procedure, the treatment site was clearly identified and confirmed by the patient. All components of Universal Protocol/PAUSE Rule completed.

## 2024-01-17 NOTE — ED PROVIDER NOTE - CROS ED SKIN ALL NEG
PREVIOUS TYE NAVIGATOR DID NOTHING WITH THE 2024 IBRANCE REENROLLMENT, I SPKE WITH THE PTS SISTER VENTURA, I WENT AHEAD AND STARTED THE KURT.     I HAVE TO GET THE PROVIDERS SIGNATURE AND ALSO FIND OUT IF PT IS APPRVD FOR FREE IBRANCE THAT WE SHIP TO THE FACILITY OR SISTERS HOME.     I LM FOR THE PTS SISTER (VENTURA) TO CALL ME,     WAITING FOR VENTURA TO CALL ME.   
negative...

## (undated) DEVICE — BLANKET WARMER UPPER ADULT

## (undated) DEVICE — PACK NEURO SO SIDE

## (undated) DEVICE — ELCTR EDGE BOVIE INSULATED NEEDLE TIP 6.5"

## (undated) DEVICE — SUT VICRYL 2-0 18" CP-2 UNDYED

## (undated) DEVICE — DRAPE INSTRUMENT POUCH

## (undated) DEVICE — DRAIN JACKSON PRATT 10FR ROUND END W TROCAR

## (undated) DEVICE — DRAPE TOWEL BLUE 17" X 24"

## (undated) DEVICE — POSITIONER OA ARM ELEVATOR DISP

## (undated) DEVICE — DRSG TELFA 3 X 4

## (undated) DEVICE — DISSECTING TOOL MIDAS REX 10CM 2MM

## (undated) DEVICE — SPONGE PEANUT AUTO COUNT

## (undated) DEVICE — FOLEY TRAY 16FR LF URINE METER SURESTEP

## (undated) DEVICE — SUT VICRYL 0 18" CT-1 UNDYED

## (undated) DEVICE — DRAPE C ARM UNIVERSAL

## (undated) DEVICE — GLV 8 PROTEXIS

## (undated) DEVICE — DRAPE SPLIT SHEETS 77X108"

## (undated) DEVICE — WRAP COMPRESSION CALF MED

## (undated) DEVICE — DRAPE SHEET XL 77X98"

## (undated) DEVICE — Device

## (undated) DEVICE — POSITIONER FOAM EGG CRATE ULNAR (2PCS)

## (undated) DEVICE — NDL SPINAL 22G X 3.5"

## (undated) DEVICE — DRAPE TOWEL 1000 SMALL 17" X 11"

## (undated) DEVICE — TUBING JET BIPOLAR

## (undated) DEVICE — SUT NYLON 2-0 18" FS

## (undated) DEVICE — DRAPE EQUIP SMART